# Patient Record
Sex: MALE | Race: WHITE | NOT HISPANIC OR LATINO | Employment: FULL TIME | URBAN - METROPOLITAN AREA
[De-identification: names, ages, dates, MRNs, and addresses within clinical notes are randomized per-mention and may not be internally consistent; named-entity substitution may affect disease eponyms.]

---

## 2023-01-15 ENCOUNTER — HOSPITAL ENCOUNTER (EMERGENCY)
Facility: HOSPITAL | Age: 23
Discharge: HOME | End: 2023-01-15
Attending: STUDENT IN AN ORGANIZED HEALTH CARE EDUCATION/TRAINING PROGRAM | Admitting: STUDENT IN AN ORGANIZED HEALTH CARE EDUCATION/TRAINING PROGRAM

## 2023-01-15 ENCOUNTER — APPOINTMENT (OUTPATIENT)
Dept: RADIOLOGY | Facility: HOSPITAL | Age: 23
End: 2023-01-15
Attending: STUDENT IN AN ORGANIZED HEALTH CARE EDUCATION/TRAINING PROGRAM

## 2023-01-15 VITALS
OXYGEN SATURATION: 98 % | DIASTOLIC BLOOD PRESSURE: 70 MMHG | RESPIRATION RATE: 18 BRPM | WEIGHT: 150 LBS | SYSTOLIC BLOOD PRESSURE: 131 MMHG | BODY MASS INDEX: 21.47 KG/M2 | HEART RATE: 66 BPM | TEMPERATURE: 98 F | HEIGHT: 70 IN

## 2023-01-15 DIAGNOSIS — F41.0 PANIC ATTACK: ICD-10-CM

## 2023-01-15 DIAGNOSIS — R06.02 SHORTNESS OF BREATH: ICD-10-CM

## 2023-01-15 LAB
ANION GAP SERPL CALCULATED.3IONS-SCNC: 16 MMOL/L (ref 7–15)
BUN SERPL-MCNC: 12.6 MG/DL (ref 6–20)
CALCIUM SERPL-MCNC: 10.3 MG/DL (ref 8.6–10)
CHLORIDE SERPL-SCNC: 101 MMOL/L (ref 98–107)
CREAT SERPL-MCNC: 1 MG/DL (ref 0.67–1.17)
D DIMER PPP FEU-MCNC: <0.27 UG/ML FEU (ref 0–0.5)
DEPRECATED HCO3 PLAS-SCNC: 21 MMOL/L (ref 22–29)
ERYTHROCYTE [DISTWIDTH] IN BLOOD BY AUTOMATED COUNT: 11.8 % (ref 10–15)
GFR SERPL CREATININE-BSD FRML MDRD: >90 ML/MIN/1.73M2
GLUCOSE SERPL-MCNC: 142 MG/DL (ref 70–99)
HCT VFR BLD AUTO: 46.5 % (ref 40–53)
HGB BLD-MCNC: 16.8 G/DL (ref 13.3–17.7)
HOLD SPECIMEN: NORMAL
MCH RBC QN AUTO: 31.8 PG (ref 26.5–33)
MCHC RBC AUTO-ENTMCNC: 36.1 G/DL (ref 31.5–36.5)
MCV RBC AUTO: 88 FL (ref 78–100)
PLATELET # BLD AUTO: 246 10E3/UL (ref 150–450)
POTASSIUM SERPL-SCNC: 3.5 MMOL/L (ref 3.4–5.3)
RBC # BLD AUTO: 5.29 10E6/UL (ref 4.4–5.9)
SODIUM SERPL-SCNC: 138 MMOL/L (ref 136–145)
TROPONIN T SERPL HS-MCNC: <6 NG/L
WBC # BLD AUTO: 9.2 10E3/UL (ref 4–11)

## 2023-01-15 PROCEDURE — 96374 THER/PROPH/DIAG INJ IV PUSH: CPT

## 2023-01-15 PROCEDURE — 71046 X-RAY EXAM CHEST 2 VIEWS: CPT

## 2023-01-15 PROCEDURE — 85379 FIBRIN DEGRADATION QUANT: CPT | Performed by: STUDENT IN AN ORGANIZED HEALTH CARE EDUCATION/TRAINING PROGRAM

## 2023-01-15 PROCEDURE — 84484 ASSAY OF TROPONIN QUANT: CPT | Performed by: STUDENT IN AN ORGANIZED HEALTH CARE EDUCATION/TRAINING PROGRAM

## 2023-01-15 PROCEDURE — 93005 ELECTROCARDIOGRAM TRACING: CPT | Performed by: STUDENT IN AN ORGANIZED HEALTH CARE EDUCATION/TRAINING PROGRAM

## 2023-01-15 PROCEDURE — 80048 BASIC METABOLIC PNL TOTAL CA: CPT | Performed by: STUDENT IN AN ORGANIZED HEALTH CARE EDUCATION/TRAINING PROGRAM

## 2023-01-15 PROCEDURE — 36415 COLL VENOUS BLD VENIPUNCTURE: CPT | Performed by: STUDENT IN AN ORGANIZED HEALTH CARE EDUCATION/TRAINING PROGRAM

## 2023-01-15 PROCEDURE — 99285 EMERGENCY DEPT VISIT HI MDM: CPT | Mod: 25

## 2023-01-15 PROCEDURE — 250N000011 HC RX IP 250 OP 636: Performed by: STUDENT IN AN ORGANIZED HEALTH CARE EDUCATION/TRAINING PROGRAM

## 2023-01-15 PROCEDURE — 85027 COMPLETE CBC AUTOMATED: CPT | Performed by: STUDENT IN AN ORGANIZED HEALTH CARE EDUCATION/TRAINING PROGRAM

## 2023-01-15 RX ORDER — LORAZEPAM 2 MG/ML
1 INJECTION INTRAMUSCULAR ONCE
Status: COMPLETED | OUTPATIENT
Start: 2023-01-15 | End: 2023-01-15

## 2023-01-15 RX ADMIN — LORAZEPAM 1 MG: 2 INJECTION INTRAMUSCULAR; INTRAVENOUS at 18:09

## 2023-01-15 ASSESSMENT — ACTIVITIES OF DAILY LIVING (ADL): ADLS_ACUITY_SCORE: 35

## 2023-01-15 NOTE — ED TRIAGE NOTES
The patient states that he is having an asthma attack, The pt used his albuterol inhaler today; he was exposed to a dog and he has an allergy to pets; the pt does not taken a maintenance medication for asthma.

## 2023-01-15 NOTE — ED PROVIDER NOTES
EMERGENCY DEPARTMENT ENCOUNTER       ED Course & Medical Decision Making     5:42 PM Met with and introduced myself to the patient. Disccused history and plan of care.    Final Impression  22 year old male presents for evaluation of shortness of breath, concerned that he is having an asthma attack.  Patient reports a history of asthma, though denies being on any inhalers chronically or daily medications currently.  Initially states that his asthma was triggered by his girlfriend's dog who he has been playing with, initially felt symptoms earlier this morning though worsens in the last 2 hours.  Per patient as well as his girlfriend they believe he may have some underlying anxiety or undiagnosed panic attacks.  Patient appears very anxious, hyperventilating upon arrival.  Lungs clear to auscultation bilaterally, no wheezing whatsoever.  Saturations 99% on room air (was initially on 2 L, though titrated this down without any change in oxygen saturations).  Chest x-ray clear, D-dimer negative.  Troponin negative.  EKG nonischemic.  CBC normal.  BMP shows borderline CO2, though with his hyperventilation this is not totally unexpected.  Patient did endorse some numbness and tingling in his fingertips and face, though suspect this was her hyperventilation.  Patient treated with 1 mg IV Ativan and symptoms dramatically improved, respiratory rate lowered patient appears more comfortable.  Discussed relatively reassuring work-up, presumptive diagnosis would be a panic attack.  Will discharge home with his significant other who will be driving tonight.    Prior to making a final disposition on this patient the results of patient's tests and other diagnostic studies were discussed with the patient. All questions were answered. Patient expressed understanding of the plan and was amenable to it.    Medical Decision Making    History:    Supplemental history from: Family Member/Significant Other    External Record(s) reviewed:  "Documented in [facility], if applicable.    Work Up:    Chart documentation includes differential considered and any EKGs or imaging independently interpreted by provider.    In additional to work up documented, I considered the following work up: See chart documentation, if applicable.    External consultation:     Discussion of management with another provider: See chart documentation, if applicable    Complicating factors:    Care impacted by chronic illness: Mental Health    Care affected by social determinants of health: Access to Medical Care (states he does not have health insurance)    Disposition considerations: Discharge. No recommendations on prescription strength medication(s). Admission consideration documented above, if applicable.      Medications   LORazepam (ATIVAN) injection 1 mg (1 mg Intravenous Given 1/15/23 1809)       New Prescriptions    No medications on file       Final Impression     1. Shortness of breath    2. Panic attack        Chief Complaint     Chief Complaint   Patient presents with     Asthma     The patient states that he is having an asthma attack, The pt used his albuterol inhaler today; he was exposed to a dog and he has an allergy to pets; the pt does not taken a maintenance medication for asthma.     [facility]     Rio Fitzpatrick is a 22 year old male who presents for evaluation of asthma.    Patient's friend reports that the patient has an onset of asthma which occurred today. She states his symptoms started when visiting and petting her dog earlier. She also states his difficultly breathing has worsened 2 hours prior upon arrival. She notes that he uses an inhaler for his conditions and doesn't take other medications. Patient's friend reports he has a history of smoking and allergy to pets. She also states he has undiagnosed panic attacks and anxiety. No other medical concerns are expressed at this time.    Patient endorses lightheadedness and numbness. He reports he feels as if \"[he] " "is going to pass out\".  He also states that his face extremity feels the most numb.     I, Maria G Monsalve am serving as a scribe to document services personally performed by Dr. Kunal Marcelo MD, based on my observation and the provider's statements to me. I, Dr. Kunal Marcelo MD attest that Maria G Monsalve is acting in a scribe capacity, has observed my performance of the services and has documented them in accordance with my direction.    Past Medical History     History reviewed. No pertinent past medical history.  History reviewed. No pertinent surgical history.  History reviewed. No pertinent family history.      Allergies   Allergen Reactions     Dogs        Relevant past medical, surgical, family and social history as documented above, has been reviewed and discussed with patient. No changes or additions, unless otherwise noted in the HPI.    Current Medications     No current outpatient medications on file.      Review of Systems     Respiratory:  Positive for difficulty breathing.     Cardiovascular: Denies chest pain  Neurologic:  Positive for numbness (face extremity). Positive for lightheadedness.  Psychiatric: Positive for panic attacks and anxiety.      Remainder of systems reviewed, unless noted in HPI all others negative.    Physical Exam     /62   Pulse 82   Temp 98  F (36.7  C) (Temporal)   Resp 12   Ht 1.778 m (5' 10\")   Wt 68 kg (150 lb)   SpO2 100%   BMI 21.52 kg/m    Constitutional: Awake, alert, appears very anxious, appears to be hyperventilating  Head: Normocephalic, atraumatic.      ENT: Mucous membranes moist.      Eyes: PERRL, EOMI, Conjunctiva normal.      Respiratory: Tachypneic, hyperventilating, saturations 99% on room air. Lungs totally clear to ascultation bilaterally, no wheezing, coarseness, or crackles  Cardiovascular: Regular rate and rhythm. +2 radial pulses, equal bilaterally. No pitting edema.      GI: Abdomen soft, non-tender  Musculoskeletal: Moves all " 4 extremities equally, strength symmetrical on bilateral uppers and lowers.      Integument: Warm, dry.   Neurologic: Alert & oriented x 3. Normal speech. Grossly normal motor and sensory function. No focal deficits noted.      Psychiatric: Anxious appearing       Labs & Imaging     Results for orders placed or performed during the hospital encounter of 01/15/23   CBC (+ platelets, no diff)   Result Value Ref Range    WBC Count 9.2 4.0 - 11.0 10e3/uL    RBC Count 5.29 4.40 - 5.90 10e6/uL    Hemoglobin 16.8 13.3 - 17.7 g/dL    Hematocrit 46.5 40.0 - 53.0 %    MCV 88 78 - 100 fL    MCH 31.8 26.5 - 33.0 pg    MCHC 36.1 31.5 - 36.5 g/dL    RDW 11.8 10.0 - 15.0 %    Platelet Count 246 150 - 450 10e3/uL   Basic metabolic panel   Result Value Ref Range    Sodium 138 136 - 145 mmol/L    Potassium 3.5 3.4 - 5.3 mmol/L    Chloride 101 98 - 107 mmol/L    Carbon Dioxide (CO2) 21 (L) 22 - 29 mmol/L    Anion Gap 16 (H) 7 - 15 mmol/L    Urea Nitrogen 12.6 6.0 - 20.0 mg/dL    Creatinine 1.00 0.67 - 1.17 mg/dL    Calcium 10.3 (H) 8.6 - 10.0 mg/dL    Glucose 142 (H) 70 - 99 mg/dL    GFR Estimate >90 >60 mL/min/1.73m2   D dimer quantitative   Result Value Ref Range    D-Dimer Quantitative <0.27 0.00 - 0.50 ug/mL FEU   Troponin T, High Sensitivity (now)   Result Value Ref Range    Troponin T, High Sensitivity <6 <=22 ng/L         EKG     Sinus rhythm with some sinus arrhythmia, rate 74.  .  .  QTc 444.  No STEMI.  Incomplete right bundle branch block present.     Kunal Marcelo MD  01/15/23 1942

## 2023-05-10 ENCOUNTER — OFFICE VISIT (OUTPATIENT)
Dept: FAMILY MEDICINE | Facility: CLINIC | Age: 23
End: 2023-05-10

## 2023-05-10 VITALS
RESPIRATION RATE: 20 BRPM | SYSTOLIC BLOOD PRESSURE: 135 MMHG | BODY MASS INDEX: 21.09 KG/M2 | DIASTOLIC BLOOD PRESSURE: 81 MMHG | HEART RATE: 86 BPM | TEMPERATURE: 98.5 F | WEIGHT: 147 LBS | OXYGEN SATURATION: 99 %

## 2023-05-10 DIAGNOSIS — J30.2 SEASONAL ALLERGIC RHINITIS, UNSPECIFIED TRIGGER: Primary | ICD-10-CM

## 2023-05-10 DIAGNOSIS — J45.20 MILD INTERMITTENT ASTHMA, UNSPECIFIED WHETHER COMPLICATED: ICD-10-CM

## 2023-05-10 DIAGNOSIS — F32.A DEPRESSION, UNSPECIFIED DEPRESSION TYPE: ICD-10-CM

## 2023-05-10 DIAGNOSIS — F41.9 ANXIETY: ICD-10-CM

## 2023-05-10 DIAGNOSIS — Z79.899 MEDICATION MANAGEMENT: ICD-10-CM

## 2023-05-10 PROCEDURE — 99204 OFFICE O/P NEW MOD 45 MIN: CPT | Performed by: PHYSICIAN ASSISTANT

## 2023-05-10 RX ORDER — ALBUTEROL SULFATE 90 UG/1
2 AEROSOL, METERED RESPIRATORY (INHALATION)
COMMUNITY
Start: 2021-06-22

## 2023-05-10 RX ORDER — FLUOXETINE 10 MG/1
10 CAPSULE ORAL DAILY
Qty: 30 CAPSULE | Refills: 0 | Status: SHIPPED | OUTPATIENT
Start: 2023-05-10 | End: 2023-06-09

## 2023-05-10 RX ORDER — LORATADINE 10 MG/1
10 CAPSULE, LIQUID FILLED ORAL
COMMUNITY
Start: 2021-06-22

## 2023-05-10 ASSESSMENT — ANXIETY QUESTIONNAIRES
5. BEING SO RESTLESS THAT IT IS HARD TO SIT STILL: NEARLY EVERY DAY
6. BECOMING EASILY ANNOYED OR IRRITABLE: NEARLY EVERY DAY
3. WORRYING TOO MUCH ABOUT DIFFERENT THINGS: NEARLY EVERY DAY
1. FEELING NERVOUS, ANXIOUS, OR ON EDGE: NEARLY EVERY DAY
IF YOU CHECKED OFF ANY PROBLEMS ON THIS QUESTIONNAIRE, HOW DIFFICULT HAVE THESE PROBLEMS MADE IT FOR YOU TO DO YOUR WORK, TAKE CARE OF THINGS AT HOME, OR GET ALONG WITH OTHER PEOPLE: VERY DIFFICULT
7. FEELING AFRAID AS IF SOMETHING AWFUL MIGHT HAPPEN: NEARLY EVERY DAY
GAD7 TOTAL SCORE: 21
2. NOT BEING ABLE TO STOP OR CONTROL WORRYING: NEARLY EVERY DAY
GAD7 TOTAL SCORE: 21

## 2023-05-10 ASSESSMENT — PATIENT HEALTH QUESTIONNAIRE - PHQ9
SUM OF ALL RESPONSES TO PHQ QUESTIONS 1-9: 16
5. POOR APPETITE OR OVEREATING: NEARLY EVERY DAY

## 2023-05-10 NOTE — PATIENT INSTRUCTIONS
Start the fluoxetine for anxiety and depression    Follow-up with primary care provider is scheduled for 1 week    You may return to the walk-in care or the ER if changes to your condition arise.

## 2023-05-10 NOTE — PROGRESS NOTES
Patient presents with:  Anxiety: Patient feels like panic attack, hard to breath, shakiness, chills happens when springs comes- triggers with allergies       Clinical Decision Making:  Patient is treated with fluoxetine for both depression and anxiety.  30 days of treatment are written out of the urgent care here.  Patient will have follow-up with establishing care with primary care provider and reevaluation of his treatment after 1 week. Expected course of resolution and indication for return was gone over and questions were answered to patient/parent's satisfaction before discharge.        ICD-10-CM    1. Seasonal allergic rhinitis, unspecified trigger  J30.2 Miscellaneous DME      2. Anxiety  F41.9 FLUoxetine (PROZAC) 10 MG capsule     PRIMARY CARE FOLLOW-UP SCHEDULING      3. Depression, unspecified depression type  F32.A FLUoxetine (PROZAC) 10 MG capsule     PRIMARY CARE FOLLOW-UP SCHEDULING      4. Medication management  Z79.899 FLUoxetine (PROZAC) 10 MG capsule     PRIMARY CARE FOLLOW-UP SCHEDULING      5. Mild intermittent asthma, unspecified whether complicated  J45.20           Patient Instructions   Start the fluoxetine for anxiety and depression    Follow-up with primary care provider is scheduled for 1 week    You may return to the walk-in care or the ER if changes to your condition arise.          HPI:  Rio Fitzpatrick is a 22 year old male who presents today for acute exacerbation of a chronic problem.  Patient states he has chronically had anxiety and depression and is having worsening of both of those symptoms.  At this time he states he has had some sadness, loss of interest feeling guilty decrease in energy concentration and some psychomotor agitation.  Patient has had intermittent suicidal ideation remotely in the past but not currently.  He has no current suicidal ideation and no plan of action.  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  Also patient feels  "that he is anxious.  He has had difficulty with asthma and seasonal allergic rhinitis which he states makes his anxiety worse because of the congestion.  Use of albuterol inhaler without spacer and over-the-counter Claritin without relief.     Patient has asked for Ativan by name for his anxiety as he has used this \"in the past\".  Patient is not currently under the care of a primary care provider in our system.    History obtained from chart review and the patient.    Problem List:  There are no relevant problems documented for this patient.      No past medical history on file.    Social History     Tobacco Use     Smoking status: Former     Types: Cigarettes     Smokeless tobacco: Former   Vaping Use     Vaping status: Not on file   Substance Use Topics     Alcohol use: Yes       Review of Systems  As above in HPI otherwise negative.    Vitals:    05/10/23 1312   BP: 135/81   BP Location: Right arm   Patient Position: Sitting   Cuff Size: Adult Regular   Pulse: 86   Resp: 20   Temp: 98.5  F (36.9  C)   TempSrc: Tympanic   SpO2: 99%   Weight: 66.7 kg (147 lb)       General: Patient is resting comfortably no acute distress is afebrile  No other physical examination was performed          Physical Exam    At the end of the encounter, I discussed results, diagnosis, medications. Discussed red flags for immediate return to clinic/ER, as well as indications for follow up if no improvement. Patient understood and agreed to plan. Patient was stable for discharge.  "